# Patient Record
Sex: FEMALE | URBAN - METROPOLITAN AREA
[De-identification: names, ages, dates, MRNs, and addresses within clinical notes are randomized per-mention and may not be internally consistent; named-entity substitution may affect disease eponyms.]

---

## 2019-10-23 ENCOUNTER — IMPORTED ENCOUNTER (OUTPATIENT)
Dept: URBAN - METROPOLITAN AREA CLINIC 9 | Facility: CLINIC | Age: 74
End: 2019-10-23

## 2020-01-15 ENCOUNTER — IMPORTED ENCOUNTER (OUTPATIENT)
Dept: URBAN - METROPOLITAN AREA CLINIC 9 | Facility: CLINIC | Age: 75
End: 2020-01-15

## 2021-01-04 NOTE — PATIENT DISCUSSION
1 4 2021 - NEW [facility] - /123 - RECOM PT GO TO INTERNIST TODAY AND RETURN ON WEDNESDAY AND IF BP UNDER BETTER CONTROL TO GET FA TO EVALUATE FURTHER.

## 2021-01-06 NOTE — PROCEDURE NOTE: CLINICAL
PROCEDURE NOTE: Eylea PFS OD. Diagnosis: Neovascular AMD with Active CNV. Anesthesia: Topical. Prep: Betadine Drops. Prior to injection, risks/benefits/alternatives discussed including but not limited to infection, loss of vision or eye, hemorrhage, cataract, glaucoma, retinal tears or detachment. The patient wished to proceed with treatment. Betadine prep was performed. Topical anesthesia was induced with Alcaine. Additional anesthesia was achieved using drop(s) or injection checked above. A drop of Povidone-iodine 5% ophthalmic solution was instilled over the injection site and in the inferior fornix. A single use prefilled syringe of intravitreal Eylea 2mg/0.05ml was used and excess was disposed of as waste. The needle was passed 3.0 mm posterior to the limbus in pseudophakic patients, and 3.5 mm posterior to the limbus in phakic patients. Injection time: 4:20 P. M. Patient tolerated procedure well. There were no complications. The eye was irrigated with sterile irrigating solution. Post procedure instructions given. The patient was instructed to use Artificial Tears q.i.d. p.r.n for comfort. The patient was instructed to return for re-evaluation in approximately 4-12 weeks depending on his/her condition and was told to call immediately if vision decreases and/or if his/her eye becomes red, painful, and/or light sensitive. The patient was instructed to go to the emergency room or call 911 if unable to reach the doctor within an hour or two of trying or calling. Telma Mendez

## 2021-01-06 NOTE — PATIENT DISCUSSION
Reviewed OPTIONS including AVASTIN/EYLEA/LUCENTIS/BEOVU and patient wants to proceed with EYLEA treatment AND REPEAT EVERY 5 WKS X 2 DOI.

## 2021-02-11 NOTE — PROCEDURE NOTE: CLINICAL
PROCEDURE NOTE: Eylea PFS OD. Diagnosis: Neovascular AMD with Active CNV. Anesthesia: Topical. Prep: Betadine Drops. Prior to injection, risks/benefits/alternatives discussed including but not limited to infection, loss of vision or eye, hemorrhage, cataract, glaucoma, retinal tears or detachment. The patient wished to proceed with treatment. Betadine prep was performed. Topical anesthesia was induced with Alcaine. Additional anesthesia was achieved using drop(s) or injection checked above. A drop of Povidone-iodine 5% ophthalmic solution was instilled over the injection site and in the inferior fornix. A single use prefilled syringe of intravitreal Eylea 2mg/0.05ml was used and excess was disposed of as waste. The needle was passed 3.0 mm posterior to the limbus in pseudophakic patients, and 3.5 mm posterior to the limbus in phakic patients. Injection time: 2:25 PM.  Patient tolerated procedure well. There were no complications. The eye was irrigated with sterile irrigating solution. Post procedure instructions given. The patient was instructed to use Artificial Tears q.i.d. p.r.n for comfort. The patient was instructed to return for re-evaluation in approximately 4-12 weeks depending on his/her condition and was told to call immediately if vision decreases and/or if his/her eye becomes red, painful, and/or light sensitive. The patient was instructed to go to the emergency room or call 911 if unable to reach the doctor within an hour or two of trying or calling. INVELTYS GIVEN TO PATIENT PRIOR TO INJECTION. PER JAD.

## 2021-03-18 NOTE — PROCEDURE NOTE: CLINICAL

## 2021-04-22 NOTE — PROCEDURE NOTE: CLINICAL

## 2021-10-16 ASSESSMENT — VISUAL ACUITY
OS_SC: 20/60 SN
OS_SC: 20/60 SN
OD_SC: 20/20 - SN
OD_SC: 20/20 - SN

## 2022-01-11 NOTE — PROCEDURE NOTE: CLINICAL

## 2022-02-15 NOTE — PROCEDURE NOTE: CLINICAL

## 2022-03-22 NOTE — PROCEDURE NOTE: CLINICAL

## 2022-04-22 NOTE — PROCEDURE NOTE: CLINICAL

## 2022-04-22 NOTE — PATIENT DISCUSSION
4 22 21 TRACE EDEMA @ 5 WKS - DOING BETTER - EYLEA AND EXTEND TO 7 WKS AND F/U WITH  [facility].